# Patient Record
Sex: MALE | Race: BLACK OR AFRICAN AMERICAN | NOT HISPANIC OR LATINO | URBAN - METROPOLITAN AREA
[De-identification: names, ages, dates, MRNs, and addresses within clinical notes are randomized per-mention and may not be internally consistent; named-entity substitution may affect disease eponyms.]

---

## 2017-07-18 PROBLEM — Z00.00 ENCOUNTER FOR PREVENTIVE HEALTH EXAMINATION: Status: ACTIVE | Noted: 2017-07-18

## 2017-07-24 ENCOUNTER — OUTPATIENT (OUTPATIENT)
Dept: OUTPATIENT SERVICES | Facility: HOSPITAL | Age: 77
LOS: 1 days | End: 2017-07-24

## 2017-07-24 ENCOUNTER — APPOINTMENT (OUTPATIENT)
Dept: MRI IMAGING | Facility: CLINIC | Age: 77
End: 2017-07-24

## 2019-09-03 ENCOUNTER — APPOINTMENT (OUTPATIENT)
Dept: OTOLARYNGOLOGY | Facility: CLINIC | Age: 79
End: 2019-09-03
Payer: MEDICARE

## 2019-09-03 DIAGNOSIS — R42 DIZZINESS AND GIDDINESS: ICD-10-CM

## 2019-09-03 DIAGNOSIS — H90.3 SENSORINEURAL HEARING LOSS, BILATERAL: ICD-10-CM

## 2019-09-03 PROCEDURE — 92557 COMPREHENSIVE HEARING TEST: CPT

## 2019-09-03 PROCEDURE — 99213 OFFICE O/P EST LOW 20 MIN: CPT

## 2019-09-03 PROCEDURE — 92567 TYMPANOMETRY: CPT

## 2019-09-03 NOTE — ASSESSMENT
[FreeTextEntry1] : It was my pressure in the his problems with balance are not likely otogenic and this was discussed.\par The hearing test was reviewed with the patient.  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  I felt that the patient would benefit from hearing aids and this was  recommended

## 2019-09-03 NOTE — PHYSICAL EXAM
[FreeTextEntry1] : \par The patient was alert and oriented and in no distress.\par Voice was clear.\par He is wheelchair bound and was able to transfer with assistance\par \par Face:\par The patient had no facial asymmetry or mass.\par The skin was unremarkable.\par \par Eyes:\par The pupils were equal round and reactive to light and accommodation.\par There was no significant nystagmus or disconjugate gaze noted.\par \par Nose: \par The external nose had no significant deformity.  There was no facial tenderness.  On anterior rhinoscopy, the nasal mucosa was clear.  The anterior septum was midline.  There were no visualized polyps purulence  or masses.\par \par Oral cavity:\par The oral mucosa was normal.\par The oral and base of tongue were clear and without mass.\par The gingival and buccal mucosa were moist and without lesions.\par The palate moved well.\par There was no cleft to the palate.\par There appeared to be good salivary flow.  \par There was no pus, erythema or mass in the oral cavity.\par \par \par Ears:\par The external ears were normal without deformity.\par The ear canals were clear.\par The tympanic membranes were intact and normal.\par \par Neck: \par The neck was symmetrical.\par The parotid and submandibular glands were normal without masses.\par The trachea was midline and there was no unusual crepitus.\par The thyroid was smooth and nontender and no masses were palpated.\par There was no significant cervical adenopathy.\par \par \par TMJ:\par The temporomandibular joints were nontender.\par There was no abnormal crepitus and no significant malocclusion\par  [de-identified] : A complete audiometric evaluation was done. This showed mild to moderate sensory neural hearing losses and type A. Tympanograms with slightly diminished discrimination bilaterally

## 2019-09-03 NOTE — CONSULT LETTER
[FreeTextEntry2] : LUZ MARIA WORLEY,TO Begum  [FreeTextEntry1] : \par \par Dear  Dr. LUZ MARIA WORLEY,TO WOODARD,\par \par I had the pleasure of seeing your patient today.  \par Please see my note below.\par \par \par Thank you very much for allowing me to participate in the care of your patient.\par \par Sincerely,\par \par \par Silverio Blank MD\par [facility]\par [facility]\par  [facility]\par \par

## 2019-09-03 NOTE — HISTORY OF PRESENT ILLNESS
[de-identified] : OLIVIA BARRON is a 78 year old male who comes in complaining of Hearing losses and problems with his balance. It is primarily when he tries to ambulate. He denies any vertigo or symptoms while recumbent or sitting. He denies otalgia or otorrhea.The patient had no other ear nose or throat complaints at this visit.

## 2019-09-17 ENCOUNTER — APPOINTMENT (OUTPATIENT)
Dept: OTOLARYNGOLOGY | Facility: CLINIC | Age: 79
End: 2019-09-17